# Patient Record
Sex: MALE | Race: WHITE | Employment: FULL TIME | URBAN - NONMETROPOLITAN AREA
[De-identification: names, ages, dates, MRNs, and addresses within clinical notes are randomized per-mention and may not be internally consistent; named-entity substitution may affect disease eponyms.]

---

## 2017-11-15 ENCOUNTER — OFFICE VISIT (OUTPATIENT)
Dept: UROLOGY | Age: 57
End: 2017-11-15
Payer: COMMERCIAL

## 2017-11-15 VITALS
HEART RATE: 124 BPM | SYSTOLIC BLOOD PRESSURE: 134 MMHG | DIASTOLIC BLOOD PRESSURE: 98 MMHG | WEIGHT: 221 LBS | HEIGHT: 72 IN | BODY MASS INDEX: 29.93 KG/M2 | TEMPERATURE: 97.6 F

## 2017-11-15 DIAGNOSIS — R97.20 ELEVATED PSA: Primary | ICD-10-CM

## 2017-11-15 DIAGNOSIS — N40.1 BENIGN PROSTATIC HYPERPLASIA WITH LOWER URINARY TRACT SYMPTOMS, SYMPTOM DETAILS UNSPECIFIED: ICD-10-CM

## 2017-11-15 LAB
APPEARANCE FLUID: NORMAL
BILIRUBIN, POC: NORMAL
BLOOD URINE, POC: NORMAL
CLARITY, POC: CLEAR
COLOR, POC: YELLOW
GLUCOSE URINE, POC: NORMAL
KETONES, POC: NORMAL
LEUKOCYTE EST, POC: NORMAL
NITRITE, POC: NORMAL
PH, POC: 5
PROTEIN, POC: NORMAL
SPECIFIC GRAVITY, POC: 1.01
UROBILINOGEN, POC: 0.2

## 2017-11-15 PROCEDURE — 99204 OFFICE O/P NEW MOD 45 MIN: CPT | Performed by: UROLOGY

## 2017-11-15 PROCEDURE — 81003 URINALYSIS AUTO W/O SCOPE: CPT | Performed by: UROLOGY

## 2017-11-15 RX ORDER — IBUPROFEN 200 MG
200 TABLET ORAL EVERY 6 HOURS PRN
COMMUNITY

## 2017-11-15 ASSESSMENT — ENCOUNTER SYMPTOMS
WHEEZING: 0
EYE DISCHARGE: 0
HEARTBURN: 0
NAUSEA: 0
SHORTNESS OF BREATH: 0
EYE REDNESS: 0

## 2017-11-15 NOTE — PROGRESS NOTES
Rober Sanchez is a 59 y.o. male who presents today   Chief Complaint   Patient presents with    New Patient     I am here today because of an elevated PSA level.  Elevated PSA     Elevated PSA:  Patient is here today for an elevated PSA. His last several PSA values are as follows:  PSA was done in the setting of health wellness screening at work PSA was 7.42 on 10/06/17    Family History of prostate cancer? yes - he says his father had prostate troubles he's not sure whether was cancer  Recent history of urinary tract infection/prostatitis? no  Recent catheterization? no  Recent acute urinary retention? no  Previous prostate biopsy? no  Lower urinary tract symptoms: frequency, decreased urinary stream and nocturia, 1 times per night    Benign Prostatic Hypertrophy  Patient complains of lower urinary tract symptoms. He reports frequency, intermittency, nocturia one time a night, weak stream and Especially in the morning. He denies incomplete emptying and straining. Patient states symptoms are of mild severity. Onset of symptoms was a few months ago and was gradual in onset. His AUA Symptom Score is, 8/35 manifested as irritative symptoms including frequency, nocturia and obstructive symptoms including intermittency, weak stream. He has no personal history and a family history of prostate cancer. He reports a history of no complicating symptoms. He denies flank pain, gross hematuria, kidney stones and recurrent UTI. History reviewed. No pertinent past medical history. Past Surgical History:   Procedure Laterality Date    DENTAL SURGERY      Implant       Current Outpatient Prescriptions   Medication Sig Dispense Refill    ibuprofen (ADVIL;MOTRIN) 200 MG tablet Take 200 mg by mouth every 6 hours as needed for Pain       No current facility-administered medications for this visit.         No Known Allergies    Social History     Social History    Marital status:      Spouse name: N/A    Number of children: N/A    Years of education: N/A     Social History Main Topics    Smoking status: Never Smoker    Smokeless tobacco: Never Used    Alcohol use None    Drug use: Unknown    Sexual activity: Not Asked     Other Topics Concern    None     Social History Narrative    None       Family History   Problem Relation Age of Onset    Other Father      Prostate Problems       REVIEW OF SYSTEMS:  Review of Systems   Constitutional: Negative for chills and fever. HENT: Negative for hearing loss. Eyes: Negative for discharge and redness. Respiratory: Negative for shortness of breath and wheezing. Cardiovascular: Negative for leg swelling and PND. Gastrointestinal: Negative for heartburn and nausea. Genitourinary: Negative for dysuria, flank pain, frequency, hematuria and urgency. Musculoskeletal: Negative for myalgias and neck pain. Skin: Negative for itching and rash. Neurological: Negative for seizures, loss of consciousness and headaches. Endo/Heme/Allergies: Negative for environmental allergies and polydipsia. Psychiatric/Behavioral: Negative for depression and suicidal ideas. PHYSICAL EXAM:  BP (!) 134/98   Pulse 124   Temp 97.6 °F (36.4 °C) (Temporal)   Ht 6' (1.829 m)   Wt 221 lb (100.2 kg)   BMI 29.97 kg/m²   Physical Exam   Constitutional: He is oriented to person, place, and time. He appears well-developed and well-nourished. HENT:   Head: Normocephalic and atraumatic. Eyes: Conjunctivae are normal. No scleral icterus. Neck: Normal range of motion. Cardiovascular: Normal rate, regular rhythm and intact distal pulses. Pulmonary/Chest: Effort normal and breath sounds normal. No respiratory distress. Abdominal: Soft. Bowel sounds are normal. He exhibits no distension and no mass. There is no tenderness. Hernia confirmed negative in the right inguinal area and confirmed negative in the left inguinal area.    Genitourinary: Rectum normal, testes normal and penis

## 2017-12-27 DIAGNOSIS — R97.20 ELEVATED PSA: ICD-10-CM

## 2017-12-28 LAB
PROSTATE SPECIFIC ANTIGEN FREE: 1.8 NG/ML
PROSTATE SPECIFIC ANTIGEN PERCENT FREE: 15 %
PROSTATE SPECIFIC ANTIGEN: 11.7 NG/ML (ref 0–4)

## 2018-01-03 ENCOUNTER — OFFICE VISIT (OUTPATIENT)
Dept: UROLOGY | Age: 58
End: 2018-01-03
Payer: COMMERCIAL

## 2018-01-03 VITALS
BODY MASS INDEX: 31.69 KG/M2 | HEART RATE: 102 BPM | SYSTOLIC BLOOD PRESSURE: 138 MMHG | WEIGHT: 234 LBS | HEIGHT: 72 IN | TEMPERATURE: 97.3 F | DIASTOLIC BLOOD PRESSURE: 72 MMHG

## 2018-01-03 DIAGNOSIS — N40.1 BENIGN PROSTATIC HYPERPLASIA WITH LOWER URINARY TRACT SYMPTOMS, SYMPTOM DETAILS UNSPECIFIED: ICD-10-CM

## 2018-01-03 DIAGNOSIS — R97.20 ELEVATED PSA: Primary | ICD-10-CM

## 2018-01-03 LAB
BACTERIA URINE, POC: NORMAL
BILIRUBIN URINE: 0 MG/DL
BLOOD, URINE: POSITIVE
CASTS URINE, POC: NORMAL
CLARITY: NORMAL
COLOR: NORMAL
CRYSTALS URINE, POC: NORMAL
EPI CELLS URINE, POC: NORMAL
GLUCOSE URINE: NORMAL
KETONES, URINE: NEGATIVE
LEUKOCYTE EST, POC: NEGATIVE
NITRITE, URINE: NEGATIVE
PH UA: 5.5 (ref 4.5–8)
PROTEIN UA: NEGATIVE
RBC URINE, POC: 1
SPECIFIC GRAVITY UA: 1.01 (ref 1–1.03)
UROBILINOGEN, URINE: NORMAL
WBC URINE, POC: NORMAL
YEAST URINE, POC: NORMAL

## 2018-01-03 PROCEDURE — 81000 URINALYSIS NONAUTO W/SCOPE: CPT | Performed by: UROLOGY

## 2018-01-03 PROCEDURE — 99999 PR ECHO,TRANSRECTAL: CPT | Performed by: UROLOGY

## 2018-01-03 PROCEDURE — 99214 OFFICE O/P EST MOD 30 MIN: CPT | Performed by: UROLOGY

## 2018-01-03 RX ORDER — CIPROFLOXACIN 500 MG/1
500 TABLET, FILM COATED ORAL 2 TIMES DAILY
Qty: 8 TABLET | Refills: 0 | Status: SHIPPED | OUTPATIENT
Start: 2018-01-03

## 2018-01-03 ASSESSMENT — ENCOUNTER SYMPTOMS
SHORTNESS OF BREATH: 0
BLURRED VISION: 0
WHEEZING: 0
HEARTBURN: 0
DOUBLE VISION: 0
NAUSEA: 0
SORE THROAT: 0

## 2018-01-03 NOTE — LETTER
ultrasound imaging system to guide the needle. In order to cover all necessary areas within the prostate, this may require up to twelve (12) individual needle penetrations of the prostate. The tissue obtained will be sent to the pathology lab at [place] along with your insurance information. The Discomfort associated with this procedure is considered mild. You should be aware that there are three possible complications to this procedure. The first complication is Infection. This could be an infection of the wall of the rectum, within the prostate gland, or could take the form of a diffuse infection throughout your entire blood stream. Because of this potential, you have been given a prescription of antibiotic pills to take starting the day before your biopsy. If you take the antibiotic pills as instructed, there is little chance of infection, but even so, you need to be aware of early symptoms of infection so that you can call the doctor immediately if you experience any of these symptoms (fever, sweats, chills, generally not feeling well, or extreme pain in the rectum several hours after procedure). The second possible complication is bleeding from the rectum or blood in the urine. A few drops of blood from the rectum, or some blood in the urine is not unusual, but if you experience continued blood in the urine or blood from the rectum for more than four days you need to contact the doctor immediately. The third possible complication is that of blood in the semen (sperm). This complication occurs occasionally and does not produce any long-term sexual dysfunction but may last up to several weeks. Blood in the semen is not associated with any pain or any other significant abnormality.                   TO THE PATIENT: you have the right to be informed about your condition and the recommended surgical, medical, or diagnostic procedure so that you may

## 2018-01-03 NOTE — PROGRESS NOTES
History    Marital status:      Spouse name: N/A    Number of children: N/A    Years of education: N/A     Social History Main Topics    Smoking status: Never Smoker    Smokeless tobacco: Never Used    Alcohol use None    Drug use: No    Sexual activity: Not Asked     Other Topics Concern    None     Social History Narrative    None       Family History   Problem Relation Age of Onset    Other Father      Prostate Problems       REVIEW OF SYSTEMS:  Review of Systems   Constitutional: Negative for chills and fever. HENT: Negative for congestion and sore throat. Eyes: Negative for blurred vision and double vision. Respiratory: Negative for shortness of breath and wheezing. Cardiovascular: Negative for chest pain and palpitations. Gastrointestinal: Negative for heartburn and nausea. Genitourinary: Negative for dysuria, flank pain, frequency, hematuria and urgency. Musculoskeletal: Negative for falls and neck pain. Skin: Negative for itching and rash. Neurological: Negative for dizziness and tingling. Endo/Heme/Allergies: Does not bruise/bleed easily. Psychiatric/Behavioral: The patient does not have insomnia. PHYSICAL EXAM:  /72   Pulse 102   Temp 97.3 °F (36.3 °C) (Temporal)   Ht 6' (1.829 m)   Wt 234 lb (106.1 kg)   BMI 31.74 kg/m²   Physical Exam   Constitutional: He is oriented to person, place, and time. He appears well-developed and well-nourished. HENT:   Head: Normocephalic and atraumatic. Eyes: Conjunctivae are normal. No scleral icterus. Neck: Normal range of motion. Cardiovascular: Normal rate, regular rhythm and intact distal pulses. Pulmonary/Chest: Effort normal and breath sounds normal. No respiratory distress. Abdominal: Soft. Bowel sounds are normal. He exhibits no distension and no mass. There is no tenderness. Hernia confirmed negative in the right inguinal area and confirmed negative in the left inguinal area.    Genitourinary:

## 2018-01-29 ENCOUNTER — HOSPITAL ENCOUNTER (OUTPATIENT)
Age: 58
Setting detail: SPECIMEN
Discharge: HOME | End: 2018-01-29
Payer: COMMERCIAL

## 2018-01-29 ENCOUNTER — PROCEDURE VISIT (OUTPATIENT)
Dept: UROLOGY | Age: 58
End: 2018-01-29
Payer: COMMERCIAL

## 2018-01-29 VITALS — HEIGHT: 72 IN | TEMPERATURE: 97.9 F | WEIGHT: 224 LBS | BODY MASS INDEX: 30.34 KG/M2

## 2018-01-29 DIAGNOSIS — R97.20 ELEVATED PSA: Primary | ICD-10-CM

## 2018-01-29 PROCEDURE — 55700 BIOPSY OF PROSTATE,NEEDLE/PUNCH: CPT | Performed by: UROLOGY

## 2018-01-29 PROCEDURE — 99999 PR SONO GUIDE NEEDLE BIOPSY: CPT | Performed by: UROLOGY

## 2018-01-29 PROCEDURE — 88305 TISSUE EXAM BY PATHOLOGIST: CPT

## 2018-01-29 PROCEDURE — 76872 US TRANSRECTAL: CPT | Performed by: UROLOGY

## 2018-01-29 PROCEDURE — 99999 PR OFFICE/OUTPT VISIT,PROCEDURE ONLY: CPT | Performed by: UROLOGY

## 2018-01-29 PROCEDURE — 96372 THER/PROPH/DIAG INJ SC/IM: CPT | Performed by: UROLOGY

## 2018-01-29 RX ORDER — GENTAMICIN SULFATE 40 MG/ML
80 INJECTION, SOLUTION INTRAMUSCULAR; INTRAVENOUS ONCE
Status: COMPLETED | OUTPATIENT
Start: 2018-01-29 | End: 2018-01-29

## 2018-01-29 RX ADMIN — GENTAMICIN SULFATE 80 MG: 40 INJECTION, SOLUTION INTRAMUSCULAR; INTRAVENOUS at 08:05

## 2018-02-05 ENCOUNTER — TELEPHONE (OUTPATIENT)
Dept: UROLOGY | Age: 58
End: 2018-02-05

## 2018-02-05 DIAGNOSIS — R97.20 ELEVATED PSA: Primary | ICD-10-CM

## 2023-09-04 ENCOUNTER — APPOINTMENT (OUTPATIENT)
Dept: CT IMAGING | Facility: HOSPITAL | Age: 63
End: 2023-09-04
Payer: MEDICARE

## 2023-09-04 ENCOUNTER — HOSPITAL ENCOUNTER (EMERGENCY)
Facility: HOSPITAL | Age: 63
Discharge: HOME | End: 2023-09-04
Attending: EMERGENCY MEDICINE | Admitting: EMERGENCY MEDICINE
Payer: MEDICARE

## 2023-09-04 VITALS
TEMPERATURE: 97.6 F | SYSTOLIC BLOOD PRESSURE: 121 MMHG | BODY MASS INDEX: 29.8 KG/M2 | OXYGEN SATURATION: 95 % | WEIGHT: 220 LBS | DIASTOLIC BLOOD PRESSURE: 80 MMHG | HEIGHT: 72 IN | HEART RATE: 61 BPM | RESPIRATION RATE: 20 BRPM

## 2023-09-04 DIAGNOSIS — R55 VASOVAGAL SYNCOPE: Primary | ICD-10-CM

## 2023-09-04 DIAGNOSIS — R31.0 GROSS HEMATURIA: ICD-10-CM

## 2023-09-04 LAB
ALBUMIN SERPL-MCNC: 4.2 G/DL (ref 3.5–5.2)
ALBUMIN/GLOB SERPL: 2 G/DL
ALP SERPL-CCNC: 76 U/L (ref 39–117)
ALT SERPL W P-5'-P-CCNC: 19 U/L (ref 1–41)
ANION GAP SERPL CALCULATED.3IONS-SCNC: 12 MMOL/L (ref 5–15)
AST SERPL-CCNC: 15 U/L (ref 1–40)
BASOPHILS # BLD AUTO: 0.08 10*3/MM3 (ref 0–0.2)
BASOPHILS NFR BLD AUTO: 0.8 % (ref 0–1.5)
BILIRUB SERPL-MCNC: 0.6 MG/DL (ref 0–1.2)
BILIRUB UR QL STRIP: NEGATIVE
BUN SERPL-MCNC: 11 MG/DL (ref 8–23)
BUN/CREAT SERPL: 12.1 (ref 7–25)
CALCIUM SPEC-SCNC: 8.7 MG/DL (ref 8.6–10.5)
CHLORIDE SERPL-SCNC: 109 MMOL/L (ref 98–107)
CLARITY UR: ABNORMAL
CO2 SERPL-SCNC: 20 MMOL/L (ref 22–29)
COLOR UR: ABNORMAL
CREAT SERPL-MCNC: 0.91 MG/DL (ref 0.76–1.27)
DEPRECATED RDW RBC AUTO: 43.7 FL (ref 37–54)
EGFRCR SERPLBLD CKD-EPI 2021: 93.5 ML/MIN/1.73
EOSINOPHIL # BLD AUTO: 0.1 10*3/MM3 (ref 0–0.4)
EOSINOPHIL NFR BLD AUTO: 1.1 % (ref 0.3–6.2)
ERYTHROCYTE [DISTWIDTH] IN BLOOD BY AUTOMATED COUNT: 13.8 % (ref 12.3–15.4)
GLOBULIN UR ELPH-MCNC: 2.1 GM/DL
GLUCOSE SERPL-MCNC: 109 MG/DL (ref 65–99)
GLUCOSE UR STRIP-MCNC: NEGATIVE MG/DL
HCT VFR BLD AUTO: 43.9 % (ref 37.5–51)
HGB BLD-MCNC: 14.4 G/DL (ref 13–17.7)
HGB UR QL STRIP.AUTO: ABNORMAL
HOLD SPECIMEN: NORMAL
HOLD SPECIMEN: NORMAL
IMM GRANULOCYTES # BLD AUTO: 0.09 10*3/MM3 (ref 0–0.05)
IMM GRANULOCYTES NFR BLD AUTO: 1 % (ref 0–0.5)
KETONES UR QL STRIP: NEGATIVE
LEUKOCYTE ESTERASE UR QL STRIP.AUTO: NEGATIVE
LYMPHOCYTES # BLD AUTO: 3 10*3/MM3 (ref 0.7–3.1)
LYMPHOCYTES NFR BLD AUTO: 31.7 % (ref 19.6–45.3)
MAGNESIUM SERPL-MCNC: 1.9 MG/DL (ref 1.6–2.4)
MCH RBC QN AUTO: 28.6 PG (ref 26.6–33)
MCHC RBC AUTO-ENTMCNC: 32.8 G/DL (ref 31.5–35.7)
MCV RBC AUTO: 87.1 FL (ref 79–97)
MONOCYTES # BLD AUTO: 0.64 10*3/MM3 (ref 0.1–0.9)
MONOCYTES NFR BLD AUTO: 6.8 % (ref 5–12)
NEUTROPHILS NFR BLD AUTO: 5.56 10*3/MM3 (ref 1.7–7)
NEUTROPHILS NFR BLD AUTO: 58.6 % (ref 42.7–76)
NITRITE UR QL STRIP: NEGATIVE
NRBC BLD AUTO-RTO: 0 /100 WBC (ref 0–0.2)
PH UR STRIP.AUTO: 7.5 [PH] (ref 5–8)
PLATELET # BLD AUTO: 300 10*3/MM3 (ref 140–450)
PMV BLD AUTO: 8.9 FL (ref 6–12)
POTASSIUM SERPL-SCNC: 4 MMOL/L (ref 3.5–5.2)
PROT SERPL-MCNC: 6.3 G/DL (ref 6–8.5)
PROT UR QL STRIP: ABNORMAL
RBC # BLD AUTO: 5.04 10*6/MM3 (ref 4.14–5.8)
SODIUM SERPL-SCNC: 141 MMOL/L (ref 136–145)
SP GR UR STRIP: 1.02 (ref 1–1.03)
UROBILINOGEN UR QL STRIP: ABNORMAL
WBC NRBC COR # BLD: 9.47 10*3/MM3 (ref 3.4–10.8)
WHOLE BLOOD HOLD COAG: NORMAL
WHOLE BLOOD HOLD SPECIMEN: NORMAL

## 2023-09-04 PROCEDURE — 70450 CT HEAD/BRAIN W/O DYE: CPT

## 2023-09-04 PROCEDURE — 99284 EMERGENCY DEPT VISIT MOD MDM: CPT

## 2023-09-04 PROCEDURE — 93010 ELECTROCARDIOGRAM REPORT: CPT | Performed by: HOSPITALIST

## 2023-09-04 PROCEDURE — 81003 URINALYSIS AUTO W/O SCOPE: CPT | Performed by: EMERGENCY MEDICINE

## 2023-09-04 PROCEDURE — 93005 ELECTROCARDIOGRAM TRACING: CPT

## 2023-09-04 PROCEDURE — 85025 COMPLETE CBC W/AUTO DIFF WBC: CPT | Performed by: EMERGENCY MEDICINE

## 2023-09-04 PROCEDURE — 83735 ASSAY OF MAGNESIUM: CPT | Performed by: EMERGENCY MEDICINE

## 2023-09-04 PROCEDURE — 80053 COMPREHEN METABOLIC PANEL: CPT | Performed by: EMERGENCY MEDICINE

## 2023-09-04 RX ORDER — SODIUM CHLORIDE 0.9 % (FLUSH) 0.9 %
10 SYRINGE (ML) INJECTION AS NEEDED
Status: DISCONTINUED | OUTPATIENT
Start: 2023-09-04 | End: 2023-09-04 | Stop reason: HOSPADM

## 2023-09-04 NOTE — ED PROVIDER NOTES
Subjective   History of Present Illness  Patient is brought to emergency room by his family with a complaint of a syncopal episode.  He says he has had some blood in his urine that started a little bit yesterday.  Today he was out somewhere and was trying to urinate and noticed a lot of clots and some difficulty get this flow started and then suddenly got weak and lightheaded and try to get out to some fresh air but apparently passed out.  Bystanders saw him fall and so they think he hit his head on the right side.  He says right now he feels back to normal.  He apparently was sweaty and sick in the stomach at the time.  He has had blood in his urine off and on every 6 months for quite some time.  He has had some testing including a prostate biopsy but does not have any definitive reason as to why he has the blood in his urine.  He denies any chest pains palpitations or shortness of breath.  He says he feels okay now.    History provided by:  Patient and spouse   used: No    Syncope  Episode history:  Single  Most recent episode:  Today  Duration:  2 minutes  Timing:  Constant  Progression:  Resolved  Chronicity:  New  Context: urination    Context: not blood draw, not bowel movement, not dehydration, not exertion, not inactivity, not medication change, not with normal activity, not sight of blood, not sitting down and not standing up    Witnessed: yes    Relieved by:  Nothing  Worsened by:  Nothing  Ineffective treatments:  None tried  Associated symptoms: diaphoresis, dizziness and weakness    Associated symptoms: no anxiety, no chest pain, no confusion, no difficulty breathing, no fever, no focal sensory loss, no focal weakness, no headaches, no malaise/fatigue, no nausea, no palpitations, no recent fall, no recent injury, no recent surgery, no rectal bleeding, no seizures, no shortness of breath, no visual change and no vomiting    Risk factors: no congenital heart disease, no coronary artery  disease, no seizures and no vascular disease      Review of Systems   Constitutional:  Positive for diaphoresis. Negative for fever and malaise/fatigue.   HENT: Negative.     Respiratory: Negative.  Negative for shortness of breath.    Cardiovascular: Negative.  Positive for syncope. Negative for chest pain and palpitations.   Gastrointestinal: Negative.  Negative for nausea and vomiting.   Genitourinary:  Positive for hematuria.   Musculoskeletal: Negative.    Skin: Negative.    Neurological:  Positive for dizziness and weakness. Negative for focal weakness, seizures and headaches.   Psychiatric/Behavioral:  Negative for confusion.    All other systems reviewed and are negative.    History reviewed. No pertinent past medical history.    No Known Allergies    Past Surgical History:   Procedure Laterality Date    LIPOMA EXCISION  2018       History reviewed. No pertinent family history.    Social History     Socioeconomic History    Marital status:    Tobacco Use    Smoking status: Some Days     Types: Cigars   Vaping Use    Vaping Use: Never used   Substance and Sexual Activity    Alcohol use: Never    Drug use: Never    Sexual activity: Defer       Prior to Admission medications    Not on File       Medications   sodium chloride 0.9 % flush 10 mL (has no administration in time range)   sodium chloride 0.9 % flush 10 mL (has no administration in time range)       Vitals:    09/04/23 0919   BP: 128/80   Pulse: 57   Resp: 20   Temp: 97.5 °F (36.4 °C)   SpO2: 94%         Objective   Physical Exam  Vitals and nursing note reviewed.   Constitutional:       Appearance: Normal appearance.   HENT:      Head: Normocephalic and atraumatic.   Eyes:      Extraocular Movements: Extraocular movements intact.      Pupils: Pupils are equal, round, and reactive to light.   Cardiovascular:      Rate and Rhythm: Normal rate and regular rhythm.   Pulmonary:      Effort: Pulmonary effort is normal.      Breath sounds: Normal  breath sounds.   Abdominal:      General: Abdomen is flat.      Palpations: Abdomen is soft.      Tenderness: There is no abdominal tenderness.   Musculoskeletal:         General: Normal range of motion.   Skin:     General: Skin is warm and dry.   Neurological:      General: No focal deficit present.      Mental Status: He is alert and oriented to person, place, and time.   Psychiatric:         Mood and Affect: Mood normal.         Behavior: Behavior normal.       Procedures         Lab Results (last 24 hours)       Procedure Component Value Units Date/Time    CBC & Differential [[id number]]  (Abnormal) Collected: 09/04/23 0929    Specimen: Blood Updated: 09/04/23 0938    Narrative:      The following orders were created for panel order CBC & Differential.  Procedure                               Abnormality         Status                     ---------                               -----------         ------                     CBC Auto Differential[[id number]]        Abnormal            Final result                 Please view results for these tests on the individual orders.    Comprehensive Metabolic Panel [[id number]]  (Abnormal) Collected: 09/04/23 0929    Specimen: Blood Updated: 09/04/23 0952     Glucose 109 mg/dL      BUN 11 mg/dL      Creatinine 0.91 mg/dL      Sodium 141 mmol/L      Potassium 4.0 mmol/L      Comment: Slight hemolysis detected by analyzer. Results may be affected.        Chloride 109 mmol/L      CO2 20.0 mmol/L      Calcium 8.7 mg/dL      Total Protein 6.3 g/dL      Albumin 4.2 g/dL      ALT (SGPT) 19 U/L      AST (SGOT) 15 U/L      Alkaline Phosphatase 76 U/L      Total Bilirubin 0.6 mg/dL      Globulin 2.1 gm/dL      A/G Ratio 2.0 g/dL      BUN/Creatinine Ratio 12.1     Anion Gap 12.0 mmol/L      eGFR 93.5 mL/min/1.73     Narrative:      GFR Normal >60  Chronic Kidney Disease <60  Kidney Failure <15      Magnesium [[id number]]  (Normal) Collected: 09/04/23 0929    Specimen: Blood Updated:  09/04/23 0952     Magnesium 1.9 mg/dL     CBC Auto Differential [[id number]]  (Abnormal) Collected: 09/04/23 0929    Specimen: Blood Updated: 09/04/23 0938     WBC 9.47 10*3/mm3      RBC 5.04 10*6/mm3      Hemoglobin 14.4 g/dL      Hematocrit 43.9 %      MCV 87.1 fL      MCH 28.6 pg      MCHC 32.8 g/dL      RDW 13.8 %      RDW-SD 43.7 fl      MPV 8.9 fL      Platelets 300 10*3/mm3      Neutrophil % 58.6 %      Lymphocyte % 31.7 %      Monocyte % 6.8 %      Eosinophil % 1.1 %      Basophil % 0.8 %      Immature Grans % 1.0 %      Neutrophils, Absolute 5.56 10*3/mm3      Lymphocytes, Absolute 3.00 10*3/mm3      Monocytes, Absolute 0.64 10*3/mm3      Eosinophils, Absolute 0.10 10*3/mm3      Basophils, Absolute 0.08 10*3/mm3      Immature Grans, Absolute 0.09 10*3/mm3      nRBC 0.0 /100 WBC     Urinalysis With Culture If Indicated - Urine, Clean Catch [[id number]]  (Abnormal) Collected: 09/04/23 1000    Specimen: Urine, Clean Catch Updated: 09/04/23 1024     Color, UA Other     Comment: pink        Appearance, UA Slightly Cloudy     pH, UA 7.5     Specific Gravity, UA 1.025     Glucose, UA Negative     Ketones, UA Negative     Bilirubin, UA Negative     Blood, UA Large (3+)     Protein, UA >=300 mg/dL (3+)     Leuk Esterase, UA Negative     Nitrite, UA Negative     Urobilinogen, UA 1.0 E.U./dL    Narrative:      In absence of clinical symptoms, the presence of pyuria, bacteria, and/or nitrites on the urinalysis result does not correlate with infection.    Urinalysis, Microscopic Only - Urine, Clean Catch [[id number]] Collected: 09/04/23 1000    Specimen: Urine, Clean Catch Updated: 09/04/23 1011            CT Head Without Contrast   Final Result   1. No acute intracranial abnormality is seen.                                                       This report was finalized on 09/04/2023 10:21 by Dr. John Gates MD.          ED Course          MDM  Number of Diagnoses or Management Options  Gross hematuria: new and requires  workup  Vasovagal syncope: new and requires workup     Amount and/or Complexity of Data Reviewed  Clinical lab tests: ordered and reviewed  Tests in the radiology section of CPT®: ordered and reviewed  Tests in the medicine section of CPT®: reviewed and ordered    Risk of Complications, Morbidity, and/or Mortality  Presenting problems: moderate  Diagnostic procedures: moderate  Management options: moderate  General comments: I told the patient his testing here is all fine except for blood in his urine.  His EKG is nonspecific and his CT is fine and his lab work is all okay other than blood in his urine.  I told him that what happened today was almost certainly a vasovagal episode secondary to the lot of blood and clots in his urine.  He said he has does not normally have that much blood or clots in his urine.  I strongly advised him to follow-up with urology for recheck.  As far as I can tell his testing on his prostate was in 2017 and I will think he had any recheck since then and this amount of blood is not normal.  He says he will do so.  I told him may always be a problem with a rising PSA and or prostate cancer did not show up on the first testing or bladder cancer or something else.  His wife and daughter in the room when I talked about this.  He is discharged in stable condition.        Final diagnoses:   Vasovagal syncope   Gross hematuria          Martin Lin Jr., MD  09/04/23 8508

## 2023-09-06 LAB
QT INTERVAL: 416 MS
QTC INTERVAL: 404 MS

## 2023-09-06 NOTE — PROGRESS NOTES
"Subjective    Mr. Ambrosio is 63 y.o. male    Chief Complaint: Gross Hematuria     History of Present Illness  Patient here with gross hematuria.  He was evaluated in September 4 2023 in the ER for syncopal episode the day prior he developed gross hematuria.  + Gross hematuria with clots. Patient states he has had intermittent gross hematuria every 6-8 months for the last year.  No industrial exposure.  + Cigar 1-2/week.  Denies flank pain.  No history of nephrolithiasis.  No  work-up performed other than urinalysis.  Creatinine 0.91.  AUA 11/35 with incomplete emptying, frequency, intermittency, urgency, weak stream, nocturia X 1.     The following portions of the patient's history were reviewed and updated as appropriate: allergies, current medications, past family history, past medical history, past social history, past surgical history and problem list.    Review of Systems    No current outpatient medications on file.    History reviewed. No pertinent past medical history.    Past Surgical History:   Procedure Laterality Date    LIPOMA EXCISION  2018       Social History     Socioeconomic History    Marital status:    Tobacco Use    Smoking status: Some Days     Types: Cigars   Vaping Use    Vaping Use: Never used   Substance and Sexual Activity    Alcohol use: Never    Drug use: Never    Sexual activity: Defer       History reviewed. No pertinent family history.    Objective    Temp 97.1 °F (36.2 °C)   Ht 182.9 cm (72\")   Wt 103 kg (227 lb 3.2 oz)   BMI 30.81 kg/m²     Physical Exam  Vitals reviewed.   Constitutional:       General: He is not in acute distress.     Appearance: He is well-developed. He is not diaphoretic.   Pulmonary:      Effort: Pulmonary effort is normal.   Abdominal:      General: There is no distension.      Palpations: Abdomen is soft. There is no mass.      Tenderness: There is no abdominal tenderness. There is no guarding or rebound.      Hernia: No hernia is present.   Skin:   "   General: Skin is warm and dry.   Neurological:      Mental Status: He is alert and oriented to person, place, and time.           Results for orders placed or performed in visit on 09/11/23   POC Urinalysis Dipstick, Multipro    Specimen: Urine   Result Value Ref Range    Color Yellow Yellow, Straw, Dark Yellow, Carolyn    Clarity, UA Clear Clear    Glucose, UA Negative Negative mg/dL    Bilirubin Negative Negative    Ketones, UA Negative Negative    Specific Gravity  1.030 1.005 - 1.030    Blood, UA Trace (A) Negative    pH, Urine 5.5 5.0 - 8.0    Protein, POC Negative Negative mg/dL    Urobilinogen, UA 1 E.U./dL (A) Normal, 0.2 E.U./dL    Nitrite, UA Negative Negative    Leukocytes Negative Negative     IPSS Questionnaire (AUA-7):  Incomplete emptying  Over the past month, how often have you had a sensation of not emptying your bladder completely after you finish?: Less than half the time (09/11/23 1016)  Frequency  Over the past month, how often have you had to urinate again less than two hours after you finishing urinating ?: Less than half the time (09/11/23 1016)  Intermittency  Over the past month, how often have you found you stopped and started again several time when you urinated ?: Less than half the time (09/11/23 1016)  Urgency  Over the last month, how difficult  have you found it to postpone urination ?: About half the time (09/11/23 1016)  Weak Stream  Over the past month, how often have you had a weak urinary stream ?: Less than 1 time in 5 (09/11/23 1016)  Straining  Over the past month, how often have you had to push or strain to begin urination ?: Not at all (09/11/23 1016)  Nocturia  Over the past month, how many times did you most typically get up to urinate from the time you went to bed until the time you got up in the morning ?: 1 time (09/11/23 1016)  Quality of life due to urinary symptoms  If you were to spend the rest of your life with your urinary condition the way it is now, how would  feel about that?: Unhappy (09/11/23 1016)    Scores  Total IPSS Score: 11 (09/11/23 1016)  Total Score = Symtomatic Level: moderately symptomatic: 8-19 (09/11/23 1016)        Assessment and Plan    Diagnoses and all orders for this visit:    1. Gross hematuria (Primary)  -     POC Urinalysis Dipstick, Multipro        Patient with gross hematuria.  Summarized old records from ER in HPI.  We will plan for full hematuria work-up.  This represents a new diagnosis of uncertain prognosis.

## 2023-09-11 ENCOUNTER — OFFICE VISIT (OUTPATIENT)
Dept: UROLOGY | Facility: CLINIC | Age: 63
End: 2023-09-11
Payer: MEDICARE

## 2023-09-11 VITALS — WEIGHT: 227.2 LBS | BODY MASS INDEX: 30.77 KG/M2 | TEMPERATURE: 97.1 F | HEIGHT: 72 IN

## 2023-09-11 DIAGNOSIS — R31.0 GROSS HEMATURIA: Primary | ICD-10-CM

## 2023-09-11 LAB
BILIRUB BLD-MCNC: NEGATIVE MG/DL
CLARITY, POC: CLEAR
COLOR UR: YELLOW
GLUCOSE UR STRIP-MCNC: NEGATIVE MG/DL
KETONES UR QL: NEGATIVE
LEUKOCYTE EST, POC: NEGATIVE
NITRITE UR-MCNC: NEGATIVE MG/ML
PH UR: 5.5 [PH] (ref 5–8)
PROT UR STRIP-MCNC: NEGATIVE MG/DL
RBC # UR STRIP: ABNORMAL /UL
SP GR UR: 1.03 (ref 1–1.03)
UROBILINOGEN UR QL: ABNORMAL

## 2023-09-11 PROCEDURE — 1160F RVW MEDS BY RX/DR IN RCRD: CPT | Performed by: UROLOGY

## 2023-09-11 PROCEDURE — 51798 US URINE CAPACITY MEASURE: CPT | Performed by: UROLOGY

## 2023-09-11 PROCEDURE — 81001 URINALYSIS AUTO W/SCOPE: CPT | Performed by: UROLOGY

## 2023-09-11 PROCEDURE — 99204 OFFICE O/P NEW MOD 45 MIN: CPT | Performed by: UROLOGY

## 2023-09-11 PROCEDURE — 1159F MED LIST DOCD IN RCRD: CPT | Performed by: UROLOGY

## 2023-09-21 ENCOUNTER — TELEPHONE (OUTPATIENT)
Dept: UROLOGY | Facility: CLINIC | Age: 63
End: 2023-09-21
Payer: MEDICARE

## 2023-09-21 NOTE — TELEPHONE ENCOUNTER
Called pt with information- she said she will let him know     ----- Message from PRECIOUS Garza sent at 9/21/2023  1:29 PM CDT -----  Not if patient already has a scheduled CT and cystoscopy patient can keep appointment with Dr. Page unless the bleeding worsens and patient is passing large clots obstructing urine flow between now and then  ----- Message -----  From: Raina Walden MA  Sent: 9/21/2023   1:22 PM CDT  To: PRECIOUS Garza    Patient's wife called- pt is seeing blood in his urine again. Do you want me to get him an appt made? He has CT 9/29 and cysto 10/13

## 2023-09-29 ENCOUNTER — HOSPITAL ENCOUNTER (OUTPATIENT)
Dept: CT IMAGING | Facility: HOSPITAL | Age: 63
Discharge: HOME | End: 2023-09-29
Admitting: UROLOGY
Payer: MEDICARE

## 2023-09-29 DIAGNOSIS — R31.0 GROSS HEMATURIA: ICD-10-CM

## 2023-09-29 LAB — CREAT BLDA-MCNC: 0.9 MG/DL (ref 0.6–1.3)

## 2023-09-29 PROCEDURE — 82565 ASSAY OF CREATININE: CPT

## 2023-09-29 PROCEDURE — 74178 CT ABD&PLV WO CNTR FLWD CNTR: CPT

## 2023-09-29 PROCEDURE — 25510000001 IOPAMIDOL 61 % SOLUTION: Performed by: UROLOGY

## 2023-09-29 RX ADMIN — IOPAMIDOL 100 ML: 612 INJECTION, SOLUTION INTRAVENOUS at 08:53

## 2023-10-13 ENCOUNTER — PROCEDURE VISIT (OUTPATIENT)
Dept: UROLOGY | Facility: CLINIC | Age: 63
End: 2023-10-13
Payer: MEDICARE

## 2023-10-13 DIAGNOSIS — R31.0 GROSS HEMATURIA: Primary | ICD-10-CM

## 2023-10-13 DIAGNOSIS — N13.8 BPH WITH URINARY OBSTRUCTION: ICD-10-CM

## 2023-10-13 DIAGNOSIS — N40.1 BPH WITH URINARY OBSTRUCTION: ICD-10-CM

## 2023-10-13 RX ORDER — FINASTERIDE 5 MG/1
5 TABLET, FILM COATED ORAL DAILY
Qty: 90 TABLET | Refills: 3 | Status: SHIPPED | OUTPATIENT
Start: 2023-10-13

## 2023-10-13 NOTE — PROGRESS NOTES
Subjective    Mr. Ambrosio is 63 y.o. male    Chief Complaint: Gross Hematuria    History of Present Illness  Patient here with gross hematuria.  He was evaluated in September 4 2023 in the ER for syncopal episode the day prior he developed gross hematuria.  + Gross hematuria with clots. Patient states he has had intermittent gross hematuria every 6-8 months for the last year.  No industrial exposure.  + Cigar 1-2/week.  Denies flank pain.  No history of nephrolithiasis.     The following portions of the patient's history were reviewed and updated as appropriate: allergies, current medications, past family history, past medical history, past social history, past surgical history and problem list.    Review of Systems    No current outpatient medications on file.    No past medical history on file.    Past Surgical History:   Procedure Laterality Date    LIPOMA EXCISION  2018       Social History     Socioeconomic History    Marital status:    Tobacco Use    Smoking status: Some Days     Types: Cigars   Vaping Use    Vaping Use: Never used   Substance and Sexual Activity    Alcohol use: Never    Drug use: Never    Sexual activity: Defer       No family history on file.    Objective    There were no vitals taken for this visit.    Physical Exam  Vitals reviewed.   Constitutional:       General: He is not in acute distress.     Appearance: He is well-developed. He is not diaphoretic.   Pulmonary:      Effort: Pulmonary effort is normal.   Abdominal:      General: There is no distension.      Palpations: Abdomen is soft. There is no mass.      Tenderness: There is no abdominal tenderness. There is no guarding or rebound.      Hernia: No hernia is present.   Skin:     General: Skin is warm and dry.   Neurological:      Mental Status: He is alert and oriented to person, place, and time.             Results for orders placed or performed in visit on 10/13/23   POC Urinalysis Dipstick, Multipro    Specimen: Urine    Result Value Ref Range    Color Yellow Yellow, Straw, Dark Yellow, Carolyn    Clarity, UA Clear Clear    Glucose, UA Negative Negative mg/dL    Bilirubin Negative Negative    Ketones, UA Negative Negative    Specific Gravity  1.030 1.005 - 1.030    Blood, UA Trace (A) Negative    pH, Urine 5.5 5.0 - 8.0    Protein, POC Negative Negative mg/dL    Urobilinogen, UA 0.2 E.U./dL Normal, 0.2 E.U./dL    Nitrite, UA Negative Negative    Leukocytes Negative Negative     Assessment and Plan    Diagnoses and all orders for this visit:    1. Gross hematuria (Primary)  -     POC Urinalysis Dipstick, Multipro    2. BPH with urinary obstruction               Pre- operative diagnosis:  Hematuria    Post operative diagnosis:  BPH    Procedure:  The patient was prepped and draped in a normal sterile fashion.  The urethra was anesthetized with 2% lidocaine jelly.  A flexible cystoscope was introduced per urethra.      Urethra:  Normal    Bladder:  mild trabeculation    Ureteral orifices:  Normal position bilaterally    Prostate:  lateral lobe hypertrophy and Large intravesical lobe     Patient tolerated the procedure well    Complications: none    Blood loss: minimal    Follow up:    Routine follow up     Patient with gross hematuria.  CT negative.  He does have a enlarged prostate.  We will start him on finasteride as I think his gross hematuria is coming from his prostate.  We will reevaluate in 6 months.  We did discuss aqua ablation today.  Discussion of this resulted in significant evaluation management in addition to the cystoscopy performed today.

## 2024-03-21 NOTE — PROGRESS NOTES
"Subjective    Mr. Ambrosio is 64 y.o. male    Chief Complaint: BPH    History of Present Illness  Patient history of gross hematuria.  He underwent cystoscopy in 2013 which showed enlarged prostate with a large intravesical lobe and lateral lobe hypertrophy.  He was started on finasteride in 2023.  Upper tract imaging was negative.  AUA score 8/35 with frequency, intermittency, urgency, weak stream, nocturia X 2.  Finasteride correction PSA 22.  Lab Results   Component Value Date    PSA 11.000 (H) 2024       The following portions of the patient's history were reviewed and updated as appropriate: allergies, current medications, past family history, past medical history, past social history, past surgical history and problem list.    Review of Systems      Current Outpatient Medications:     finasteride (PROSCAR) 5 MG tablet, Take 1 tablet by mouth Daily., Disp: 90 tablet, Rfl: 3    History reviewed. No pertinent past medical history.    Past Surgical History:   Procedure Laterality Date    LIPOMA EXCISION         Social History     Socioeconomic History    Marital status:    Tobacco Use    Smoking status: Former     Types: Cigars     Quit date: 10/2023     Years since quittin.5    Smokeless tobacco: Never   Vaping Use    Vaping status: Never Used   Substance and Sexual Activity    Alcohol use: Never    Drug use: Never    Sexual activity: Defer       History reviewed. No pertinent family history.    Objective    Temp 97.4 °F (36.3 °C)   Ht 182.9 cm (72\")   Wt 102 kg (225 lb 12.8 oz)   BMI 30.62 kg/m²     Physical Exam  Genitourinary:     Comments: BARON deferred per patient wishes            Results for orders placed or performed in visit on 04/10/24   POC Urinalysis Dipstick, Multipro    Specimen: Urine   Result Value Ref Range    Color Yellow Yellow, Straw, Dark Yellow, Carolyn    Clarity, UA Clear Clear    Glucose, UA Negative Negative mg/dL    Bilirubin Negative Negative    " Ketones, UA Negative Negative    Specific Gravity  1.020 1.005 - 1.030    Blood, UA Trace (A) Negative    pH, Urine 6.5 5.0 - 8.0    Protein, POC Negative Negative mg/dL    Urobilinogen, UA 0.2 E.U./dL Normal, 0.2 E.U./dL    Nitrite, UA Negative Negative    Leukocytes Negative Negative     IPSS Questionnaire (AUA-7):  Incomplete emptying  Over the past month, how often have you had a sensation of not emptying your bladder completely after you finished urinating?: Not at all (04/10/24 1323)  Frequency  Over the past month, how often have you had to urinate again less than two hours after you finishied urinating ?: Less than half the time (04/10/24 1323)  Intermittency  Over the past month, how often have you found you stopped and started again several time when you urinated ?: Less than 1 time in 5 (04/10/24 1323)  Urgency  Over the last month, how often have you found it difficult  have you found it difficult to postpone urination ?: Less than half the time (04/10/24 1323)  Weak Stream  Over the past month, how often have you had a weak urinary stream ?: Less than 1 time in 5 (04/10/24 1323)  Straining  Over the past month, how often have you had to push or strain to begin urination ?: Not at all (04/10/24 1323)  Nocturia  Over the past month, how many times did you most typically get up to urinate from the time you went to bed until the time you got up in the morning ?: 2 times (04/10/24 1323)  Quality of life due to urinary symptoms  If you were to spend the rest of your life with your urinary condition the way it is now, how would feel about that?: Mostly satisfied (04/10/24 1323)    Scores  Total IPSS Score: (!) 8 (04/10/24 1323)  Total Score = Symptomatic Level: Moderately symptomatic: 8-19 (04/10/24 1323)        Assessment and Plan    Diagnoses and all orders for this visit:    1. BPH with urinary obstruction (Primary)  -     POC Urinalysis Dipstick, Multipro    2. Elevated PSA    This represents an  "undiagnosed  problem with uncertain prognosis.      I discussed elevated PSA with the patient today.  We discussed that PSA is a protein measured in the bloodstream that comes exclusively from the prostate gland.  I mentioned to him that all men with a prostate gland will have a certain PSA level.  We discussed this number can be compared to all men, or men of a certain age.  We can also follow trend of PSA which is called the PSA velocity.  We discussed the prostate cancer is a possible cause of PSA elevation, but benign etiologies such as infection, enlargement, aging, and inflammation should also be considered.  There is also convincing evidence that some patients will have a PSA that waxes and wanes completely unrelated to symptomatic disease of the prostate for cancer.  We discussed that some patients with a normal PSA may also have prostate cancer.  The necessity of digital rectal exam is also discussed.  We discussed the role of free to total PSA ratio.  It is explained that this test is done in hopes of avoiding unnecessary biopsies, but that a few patients with prostate cancer will have false-negative results when measuring there free PSA.  We also discussed that PSA, in many patients, varies considerably for unexplained reasons.  Sometimes repeating the PSA in a few months gives time for a \"correction \" to baseline.    We did discuss the natural course of prostate cancer in most patients.  It is explained that waiting to see what the results of this test*are very unlikely to affect the clinical course of the disease.  However, there are exceptions in the biology of each cancer.  The risks and possible benefits of transrectal ultrasound with biopsy of the prostate gland is also discussed.  I did explain that biopsy is the standard of care for diagnosing prostate cancer. The risks, alternatives, and benefits of this treatment recommendation are discussed.      Lastly we discussed use of multi parametric MRI " "in the \"pre-biopsy \"setting. The available evidence suggests that incorporation of prebiopsy MRI into the diagnostic pathway for a clinically suspected prostate cancer improves the diagnosis of clinically significant disease, reduces adverse effects from biopsy, and can potentially prevent unnecessary biopsies in some individuals. The PROMIS study, has shown that standard biopsy may miss up to half of clinically significant disease compared with 5 mm template mapping biopsies.  Furthermore 3 studies (PRECISION,4M&MRI-FIRST) have shown that MRI targeted biopsy is detect more clinically significant disease and reduce over detection of indolent disease whilst allowing between 1/3-1/2 of men to avoid immediate biopsy.  However, I did explain that studies have also shown that one fourth of the [place] grade 3+4 lesions (which may or may not be clinically significant) are not detected by the MRI. However, there is no consensus on the appropriate selection of males for MRI prior to first TRUS prostate biopsy, and this is an area in evolution. Not surprisingly, guidelines from expert groups are variable:    ?Updated guidelines for prostate cancer diagnosis and management from the [place] [employer] [place] [employer] and Care Excellence (NICE) suggest offering multiparametric MRI as the first-line investigation for all people with suspected clinically localized prostate cancer.    ?National Comprehensive Cancer Network (NCCN) guidelines recommend consideration of MRI, but they do not provide any guidelines for selecting appropriate candidates [46].    ?American Urological Association (AUA) guidelines state that there are insufficient data to recommend routine MRI in every biopsy-naïve patient under consideration for prostate biopsy. Its use may be considered in males for whom the clinical indications for biopsy are uncertain (minimal PSA increase, abnormal digital rectal examination [BARON] with normal PSA, or very " young or old patients).   ?Updated year 2019 guidelines from the  Association of Urology (EAU) endorse MRI prior to initial biopsy.    ?Guidelines from [facility] ([facility]) do not advocate MRI prior to initial TRUS-guided biopsy.     The bottom line is that Prostate MRI with MRI-directed biopsy is increasingly regarded as a valuable tool for refining risk status for clinically meaningful prostate cancer. Multiparametric MRI-directed biopsy is more sensitive than systematic TRUS-guided techniques (particularly for anterior lesions) when used alone for detecting clinically significant prostate cancers and, importantly, for reducing the number of insignificant cancers diagnosed.  Patient has opted for a prebiopsy MRI.

## 2024-04-04 ENCOUNTER — TELEPHONE (OUTPATIENT)
Dept: UROLOGY | Facility: CLINIC | Age: 64
End: 2024-04-04
Payer: MEDICARE

## 2024-04-04 NOTE — TELEPHONE ENCOUNTER
Called Patient to remind them to get PSA prior to appointment.  Spoke with Patient's wife.  If patient calls back it is ok for the HUB to tell the pt the message.

## 2024-04-05 ENCOUNTER — LAB (OUTPATIENT)
Dept: LAB | Facility: HOSPITAL | Age: 64
End: 2024-04-05
Payer: MEDICARE

## 2024-04-05 DIAGNOSIS — N13.8 BPH WITH URINARY OBSTRUCTION: ICD-10-CM

## 2024-04-05 DIAGNOSIS — N40.1 BPH WITH URINARY OBSTRUCTION: ICD-10-CM

## 2024-04-05 PROCEDURE — 36415 COLL VENOUS BLD VENIPUNCTURE: CPT

## 2024-04-05 PROCEDURE — 84153 ASSAY OF PSA TOTAL: CPT

## 2024-04-06 LAB — PSA SERPL-MCNC: 11 NG/ML (ref 0–4)

## 2024-04-10 ENCOUNTER — OFFICE VISIT (OUTPATIENT)
Dept: UROLOGY | Facility: CLINIC | Age: 64
End: 2024-04-10
Payer: MEDICARE

## 2024-04-10 VITALS — WEIGHT: 225.8 LBS | HEIGHT: 72 IN | TEMPERATURE: 97.4 F | BODY MASS INDEX: 30.58 KG/M2

## 2024-04-10 DIAGNOSIS — N40.1 BPH WITH URINARY OBSTRUCTION: Primary | ICD-10-CM

## 2024-04-10 DIAGNOSIS — R97.20 ELEVATED PSA: ICD-10-CM

## 2024-04-10 DIAGNOSIS — N13.8 BPH WITH URINARY OBSTRUCTION: Primary | ICD-10-CM

## 2024-04-10 LAB
BILIRUB BLD-MCNC: NEGATIVE MG/DL
CLARITY, POC: CLEAR
COLOR UR: YELLOW
GLUCOSE UR STRIP-MCNC: NEGATIVE MG/DL
KETONES UR QL: NEGATIVE
LEUKOCYTE EST, POC: NEGATIVE
NITRITE UR-MCNC: NEGATIVE MG/ML
PH UR: 6.5 [PH] (ref 5–8)
PROT UR STRIP-MCNC: NEGATIVE MG/DL
RBC # UR STRIP: ABNORMAL /UL
SP GR UR: 1.02 (ref 1–1.03)
UROBILINOGEN UR QL: ABNORMAL

## 2024-05-06 NOTE — PROGRESS NOTES
Subjective    Mr. Ambrosio is 64 y.o. male    Chief Complaint: Elevated PSA    History of Present Illness    Patient history of gross hematuria. He underwent cystoscopy in 2013 which showed enlarged prostate with a large intravesical lobe and lateral lobe hypertrophy. He was started on finasteride in 2023. Upper tract imaging was negative. History of negative biopsy by Dr. Chapman in .  AUA score 8/35 with frequency, intermittency, urgency, weak stream, nocturia X 2. Finasteride correction PSA 22.   MRI 2024 shows 174 cc prostate with no PI-RADS 3 lesions.  PSAD 0.13.    Lab Results   Component Value Date    PSA 11.000 (H) 2024       The following portions of the patient's history were reviewed and updated as appropriate: allergies, current medications, past family history, past medical history, past social history, past surgical history and problem list.    Review of Systems      Current Outpatient Medications:     finasteride (PROSCAR) 5 MG tablet, Take 1 tablet by mouth Daily., Disp: 90 tablet, Rfl: 3    History reviewed. No pertinent past medical history.    Past Surgical History:   Procedure Laterality Date    LIPOMA EXCISION         Social History     Socioeconomic History    Marital status:    Tobacco Use    Smoking status: Former     Types: Cigars     Quit date: 10/2023     Years since quittin.6    Smokeless tobacco: Never   Vaping Use    Vaping status: Never Used   Substance and Sexual Activity    Alcohol use: Never    Drug use: Never    Sexual activity: Defer       History reviewed. No pertinent family history.    Objective    There were no vitals taken for this visit.    Physical Exam        Results for orders placed or performed during the hospital encounter of 24   POC Creatinine    Specimen: Blood   Result Value Ref Range    Creatinine 0.90 0.60 - 1.30 mg/dL     Assessment and Plan    Diagnoses and all orders for this visit:    1. BPH with urinary  obstruction (Primary)    2. Elevated PSA        I personally reviewed his MRI today which shows 174 cc gland with no PI-RADS 3 lesions or greater. PSAD 0.13. Will send todays urine for ExoDX and call with results.  Prelim plan to see in six months with repeat PSA.

## 2024-05-21 ENCOUNTER — HOSPITAL ENCOUNTER (OUTPATIENT)
Dept: MRI IMAGING | Facility: HOSPITAL | Age: 64
Discharge: HOME | End: 2024-05-21
Admitting: UROLOGY
Payer: MEDICARE

## 2024-05-21 DIAGNOSIS — R97.20 ELEVATED PSA: ICD-10-CM

## 2024-05-21 LAB — CREAT BLDA-MCNC: 0.9 MG/DL (ref 0.6–1.3)

## 2024-05-21 PROCEDURE — A9577 INJ MULTIHANCE: HCPCS | Performed by: UROLOGY

## 2024-05-21 PROCEDURE — 82565 ASSAY OF CREATININE: CPT

## 2024-05-21 PROCEDURE — 0 GADOBENATE DIMEGLUMINE 529 MG/ML SOLUTION: Performed by: UROLOGY

## 2024-05-21 PROCEDURE — 72197 MRI PELVIS W/O & W/DYE: CPT

## 2024-05-21 RX ADMIN — GADOBENATE DIMEGLUMINE 20 ML: 529 INJECTION, SOLUTION INTRAVENOUS at 09:42

## 2024-05-22 ENCOUNTER — OFFICE VISIT (OUTPATIENT)
Dept: UROLOGY | Facility: CLINIC | Age: 64
End: 2024-05-22
Payer: MEDICARE

## 2024-05-22 DIAGNOSIS — N40.1 BPH WITH URINARY OBSTRUCTION: Primary | ICD-10-CM

## 2024-05-22 DIAGNOSIS — N13.8 BPH WITH URINARY OBSTRUCTION: Primary | ICD-10-CM

## 2024-05-22 DIAGNOSIS — R97.20 ELEVATED PSA: ICD-10-CM

## 2024-05-22 PROCEDURE — 99214 OFFICE O/P EST MOD 30 MIN: CPT | Performed by: UROLOGY

## 2024-05-22 PROCEDURE — 1160F RVW MEDS BY RX/DR IN RCRD: CPT | Performed by: UROLOGY

## 2024-05-22 PROCEDURE — 1159F MED LIST DOCD IN RCRD: CPT | Performed by: UROLOGY

## 2024-05-30 ENCOUNTER — TELEPHONE (OUTPATIENT)
Dept: UROLOGY | Facility: CLINIC | Age: 64
End: 2024-05-30
Payer: MEDICARE

## 2024-05-30 NOTE — TELEPHONE ENCOUNTER
Called and discussed ExoDX test with patient.  Given prostate volume, PSAD, MRI negative have recommended holding off on biopsy at this point.  Keep regularly scheduled follow up.

## 2024-10-30 ENCOUNTER — TELEPHONE (OUTPATIENT)
Dept: UROLOGY | Facility: CLINIC | Age: 64
End: 2024-10-30
Payer: MEDICARE

## 2024-10-30 DIAGNOSIS — R31.0 GROSS HEMATURIA: ICD-10-CM

## 2024-10-30 DIAGNOSIS — N13.8 BPH WITH URINARY OBSTRUCTION: ICD-10-CM

## 2024-10-30 DIAGNOSIS — N40.1 BPH WITH URINARY OBSTRUCTION: ICD-10-CM

## 2024-10-30 RX ORDER — FINASTERIDE 5 MG/1
5 TABLET, FILM COATED ORAL DAILY
Qty: 90 TABLET | Refills: 3 | Status: SHIPPED | OUTPATIENT
Start: 2024-10-30

## 2024-11-06 NOTE — PROGRESS NOTES
"Subjective    Mr. Ambrosio is 64 y.o. male    Chief Complaint: Elevated PSA    History of Present Illness  Patient history of gross hematuria. He underwent cystoscopy in 2013 which showed enlarged prostate with a large intravesical lobe and lateral lobe hypertrophy. He was started on finasteride in 2023. Patient having ED. Upper tract imaging was negative. History of negative biopsy by Dr. Chapman in 2018.  AUA score 5/35 with frequency,  urgency,  nocturia X 1. Finasteride correction PSA 30.4.   MRI 2024 shows 174 cc prostate with no PI-RADS 3 lesions.  PSAD 0.17.  ExoDX  2418.4.    Lab Results   Component Value Date    PSA 15.200 (H) 2024    PSA 11.000 (H) 2024       The following portions of the patient's history were reviewed and updated as appropriate: allergies, current medications, past family history, past medical history, past social history, past surgical history and problem list.    Review of Systems      Current Outpatient Medications:     finasteride (PROSCAR) 5 MG tablet, Take 1 tablet by mouth Daily., Disp: 90 tablet, Rfl: 3    History reviewed. No pertinent past medical history.    Past Surgical History:   Procedure Laterality Date    LIPOMA EXCISION         Social History     Socioeconomic History    Marital status:    Tobacco Use    Smoking status: Former     Types: Cigars     Quit date: 10/2023     Years since quittin.1    Smokeless tobacco: Never   Vaping Use    Vaping status: Never Used   Substance and Sexual Activity    Alcohol use: Never    Drug use: Never    Sexual activity: Defer       History reviewed. No pertinent family history.    Objective    Temp 96.4 °F (35.8 °C)   Ht 182.9 cm (72\")   Wt 99.4 kg (219 lb 3.2 oz)   BMI 29.73 kg/m²     Physical Exam        Results for orders placed or performed in visit on 24   POC Urinalysis Dipstick, Multipro    Collection Time: 24  8:10 AM    Specimen: Urine   Result Value Ref Range    " Color Yellow Yellow, Straw, Dark Yellow, Carolyn    Clarity, UA Clear Clear    Glucose, UA Negative Negative mg/dL    Bilirubin Negative Negative    Ketones, UA Negative Negative    Specific Gravity  1.020 1.005 - 1.030    Blood, UA Trace (A) Negative    pH, Urine 6.5 5.0 - 8.0    Protein, POC 30 mg/dL (A) Negative mg/dL    Urobilinogen, UA 1 E.U./dL (A) Normal, 0.2 E.U./dL    Nitrite, UA Negative Negative    Leukocytes Trace (A) Negative   IPSS Questionnaire (AUA-7):  Incomplete emptying  Over the past month, how often have you had a sensation of not emptying your bladder completely after you finished urinating?: Not at all (11/20/24 0805)  Frequency  Over the past month, how often have you had to urinate again less than two hours after you finishied urinating ?: Less than half the time (11/20/24 0805)  Intermittency  Over the past month, how often have you found you stopped and started again several time when you urinated ?: Not at all (11/20/24 0805)  Urgency  Over the last month, how often have you found it difficult  have you found it difficult to postpone urination ?: Less than half the time (11/20/24 0805)  Weak Stream  Over the past month, how often have you had a weak urinary stream ?: Not at all (11/20/24 0805)  Straining  Over the past month, how often have you had to push or strain to begin urination ?: Not at all (11/20/24 0805)  Nocturia  Over the past month, how many times did you most typically get up to urinate from the time you went to bed until the time you got up in the morning ?: 1 time (11/20/24 0805)  Quality of life due to urinary symptoms  If you were to spend the rest of your life with your urinary condition the way it is now, how would feel about that?: Delighted (11/20/24 0805)    Scores  Total IPSS Score: 5 (11/20/24 0805)  Total Score = Symptomatic Level: Mildly symptomatic: 0-7 (11/20/24 0805)        Assessment and Plan    Diagnoses and all orders for this visit:    1. Elevated PSA  (Primary)  -     POC Urinalysis Dipstick, Multipro  -     PSA DIAGNOSTIC; Future  -     PSA DIAGNOSTIC; Future    2. BPH with urinary obstruction        MRI 5/24 which shows 174 cc gland with no PI-RADS 3 lesions or greater. PSAD 0.17. 5/24 ExoDX test 18.4.     Difficult situation.  PSA continues to elevate.  He has a negative MRI from May 2024 which showed a very large gland 174 cc.  He is undergone ExoDx testing which revealed a score of only 18.6.  We discussed the 10% risk of undiagnosed prostate cancer with a negative MRI.  We discussed options including repeating PSA versus proceeding with 12 core biopsy in the office.  He wants to hold off on biopsy at this point.  Will repeat a PSA in 3 months.  If it does continue to elevate then we we will have to proceed with office-based biopsy.    Continue finasteride for previous gross hematuria and BPH.  We did discuss coming off finasteride as he is having side effects with erectile dysfunction and proceeding with aqua ablation.  Patient wants to hold off on that at this point.

## 2024-11-15 ENCOUNTER — TELEPHONE (OUTPATIENT)
Dept: UROLOGY | Facility: CLINIC | Age: 64
End: 2024-11-15
Payer: MEDICARE

## 2024-11-18 ENCOUNTER — LAB (OUTPATIENT)
Dept: LAB | Facility: HOSPITAL | Age: 64
End: 2024-11-18
Payer: MEDICARE

## 2024-11-18 DIAGNOSIS — R97.20 ELEVATED PSA: ICD-10-CM

## 2024-11-18 PROCEDURE — 84153 ASSAY OF PSA TOTAL: CPT

## 2024-11-18 PROCEDURE — 36415 COLL VENOUS BLD VENIPUNCTURE: CPT

## 2024-11-19 LAB — PSA SERPL-MCNC: 15.2 NG/ML (ref 0–4)

## 2024-11-20 ENCOUNTER — TELEPHONE (OUTPATIENT)
Dept: UROLOGY | Facility: CLINIC | Age: 64
End: 2024-11-20

## 2024-11-20 ENCOUNTER — OFFICE VISIT (OUTPATIENT)
Dept: UROLOGY | Facility: CLINIC | Age: 64
End: 2024-11-20
Payer: MEDICARE

## 2024-11-20 VITALS — BODY MASS INDEX: 29.69 KG/M2 | TEMPERATURE: 96.4 F | HEIGHT: 72 IN | WEIGHT: 219.2 LBS

## 2024-11-20 DIAGNOSIS — N13.8 BPH WITH URINARY OBSTRUCTION: ICD-10-CM

## 2024-11-20 DIAGNOSIS — N40.1 BPH WITH URINARY OBSTRUCTION: ICD-10-CM

## 2024-11-20 DIAGNOSIS — R97.20 ELEVATED PSA: Primary | ICD-10-CM

## 2024-11-20 LAB
BILIRUB BLD-MCNC: NEGATIVE MG/DL
CLARITY, POC: CLEAR
COLOR UR: YELLOW
GLUCOSE UR STRIP-MCNC: NEGATIVE MG/DL
KETONES UR QL: NEGATIVE
LEUKOCYTE EST, POC: ABNORMAL
NITRITE UR-MCNC: NEGATIVE MG/ML
PH UR: 6.5 [PH] (ref 5–8)
PROT UR STRIP-MCNC: ABNORMAL MG/DL
RBC # UR STRIP: ABNORMAL /UL
SP GR UR: 1.02 (ref 1–1.03)
UROBILINOGEN UR QL: ABNORMAL

## 2024-11-20 NOTE — TELEPHONE ENCOUNTER
Called pt wife back. She could not remember the size of pt prostate. I advised her it is 174cc. She v/u

## 2024-11-20 NOTE — TELEPHONE ENCOUNTER
Caller: alexis sanchez    Relationship to patient: Emergency Contact    Best call back number: [phone]     Patient is needing: RECEIVED A CALL FROM PT'S SPOUSE. SHE STATED SHE FORGOT THE LAB RESULTS AND THE CLINICAL INFORMATION AFTER TODAYS APPOINTMENT. OFFICE PLEASE CALL HER BACK WITH THESE CLINICAL QUESTIONS. SHE SAID SHE WAS TOLD A NUMBER FOR LAB RESULTS AND COULD NOT REMEMBER EXACTLY WHAT WAS TOLD TO HER AND THE PT.

## 2025-02-17 ENCOUNTER — LAB (OUTPATIENT)
Dept: LAB | Facility: HOSPITAL | Age: 65
End: 2025-02-17
Payer: MEDICARE

## 2025-02-17 DIAGNOSIS — R97.20 ELEVATED PSA: ICD-10-CM

## 2025-02-17 LAB — PSA SERPL-MCNC: 10.4 NG/ML (ref 0–4)

## 2025-02-17 PROCEDURE — 36415 COLL VENOUS BLD VENIPUNCTURE: CPT

## 2025-02-17 PROCEDURE — 84153 ASSAY OF PSA TOTAL: CPT
